# Patient Record
Sex: MALE | Race: WHITE
[De-identification: names, ages, dates, MRNs, and addresses within clinical notes are randomized per-mention and may not be internally consistent; named-entity substitution may affect disease eponyms.]

---

## 2021-09-27 ENCOUNTER — HOSPITAL ENCOUNTER (OUTPATIENT)
Dept: HOSPITAL 61 - RAD | Age: 56
End: 2021-09-27
Attending: FAMILY MEDICINE
Payer: COMMERCIAL

## 2021-09-27 DIAGNOSIS — M48.07: ICD-10-CM

## 2021-09-27 DIAGNOSIS — M51.36: Primary | ICD-10-CM

## 2021-09-27 DIAGNOSIS — M25.78: ICD-10-CM

## 2021-09-27 PROCEDURE — 72100 X-RAY EXAM L-S SPINE 2/3 VWS: CPT

## 2021-09-27 NOTE — RAD
EXAM:  XR LUMBAR SPINE 2-3V 9/27/2021 9:46 AM



CLINICAL INDICATION:  Low back pain



COMPARISON:  None



TECHNIQUE:  2 views of the lumbar spine



FINDINGS:  There is lumbarization of S1 with a rudimentary disc at S1-S2. No acute fracture. Alignmen
t is normal. Mild disc space narrowing at L5-S1. There are small anterior osteophytes at L3-L4, L4-L5
, and L5-S1. No significant facet arthrosis.



IMPRESSION:  

1. Mild lower lumbar degenerative disc disease.

2. Lumbarization of S1.



Electronically signed by: Osiris Tiwari MD (9/27/2021 5:42 PM) [id number]

## 2022-01-28 ENCOUNTER — HOSPITAL ENCOUNTER (OUTPATIENT)
Dept: HOSPITAL 61 - MRI | Age: 57
End: 2022-01-28
Attending: PHYSICIAN ASSISTANT
Payer: COMMERCIAL

## 2022-01-28 DIAGNOSIS — M47.26: Primary | ICD-10-CM

## 2022-01-28 DIAGNOSIS — Q76.49: ICD-10-CM

## 2022-01-28 DIAGNOSIS — M48.061: ICD-10-CM

## 2022-01-28 DIAGNOSIS — M51.16: ICD-10-CM

## 2022-01-28 PROCEDURE — 72158 MRI LUMBAR SPINE W/O & W/DYE: CPT

## 2022-01-28 NOTE — RAD
MRI LUMBAR SPINE WITHOUT AND WITH IV CONTRAST 



Date: 1/28/2022 11:02 AM 



Indication:  LUMBOSACRAL RADICULOPATHY   



Comparison:  Lumbar spine MRI 4/29/2016. 



Technique: Multi-planar multi-weighted magnetic resonance imaging of the lumbar spine was performed w
ith and without intravenous contrast using the standard lumbar spine protocol. 18 cc Clariscan contra
st was administered intravenously during the examination.



FINDINGS:



Transitional anatomy at the lumbosacral junction. For the purposes of this dictation, the transitiona
l element is designated a sacralized L5 vertebra with rudimentary disc at L5-S1 and left pseudoarthro
sis. Vertebral body numbering is done in concordance with the prior MRI dated 4/29/2016.



The lumbar spine is normally aligned. No acute fracture. Mild multilevel degenerative disc desiccatio
n and disc height loss. Trace degenerative endplate edema at L4-5.



The conus terminates at a normal level. No abnormal signal is seen within the visualized distal spina
l cord. No clumping of intrathecal nerve roots.



No soft tissue abnormality in the visualized abdomen or pelvis.



T12-L1: No disc bulge. No facet arthropathy. No significant spinal stenosis or neural foraminal narro
wing. 



L1-L2: No disc bulge. No facet arthropathy. No significant spinal stenosis or neural foraminal narrow
ing. 



L2-L3: Disc bulge. Mild facet arthropathy. No significant spinal stenosis or neural foraminal narrowi
ng. 



L3-L4: Disc bulge. Moderate facet arthropathy. No significant spinal stenosis. Mild right neural fora
dimitri narrowing. 



L4-L5: Right hemilaminotomy. Disc bulge with annular tear. Mild facet arthropathy. No significant spi
nal stenosis. Mild left lateral recess narrowing. Mild to moderate right and mild left neural foramin
al narrowing. 



L5-S1: No significant spinal stenosis or neural foraminal narrowing. 



IMPRESSION:



1. Transitional lumbosacral anatomy.



2. Mild to moderate lumbar spondylosis.



Electronically signed by: Cem Corrales MD (1/28/2022 1:29 PM) [id number]

## 2022-05-19 ENCOUNTER — HOSPITAL ENCOUNTER (OUTPATIENT)
Dept: HOSPITAL 61 - KCIC US | Age: 57
End: 2022-05-19
Attending: FAMILY MEDICINE
Payer: COMMERCIAL

## 2022-05-19 DIAGNOSIS — M79.605: ICD-10-CM

## 2022-05-19 DIAGNOSIS — R25.2: Primary | ICD-10-CM

## 2022-05-19 DIAGNOSIS — M79.604: ICD-10-CM

## 2022-05-19 PROCEDURE — 93925 LOWER EXTREMITY STUDY: CPT

## 2022-05-19 NOTE — [facility]
Bilateral  lower extremity arterial ultrasound 



History: : 56 years  Male  Reason: BILATERAL LEG PAIN WITH MUSCLE CRAMPS 



Findings: Multiple grayscale, color, and duplex spectral analysis sonographic images were acquired of
 the lower extremity arteries. 



COMPARISON:  None.





FINDINGS:

Grayscale evaluation of the femoropopliteal segments demonstrate scattered the atherosclerotic plaque
.



Color Doppler demonstrate patent arterial segments from the CFA to dorsalis pedis and posterior tibia
l arteries.



Waveforms:

Right LE: Triphasic throughout

Left LE: Triphasic throughout





Velocities: Velocities are within normal limits throughout.





Impression:

No evidence of high-grade stenosis in  bilateral lower extremity arteries.



Electronically signed by: Silas Reynoso MD (5/19/2022 3:07 PM) [id number]